# Patient Record
Sex: FEMALE | Race: WHITE | NOT HISPANIC OR LATINO
[De-identification: names, ages, dates, MRNs, and addresses within clinical notes are randomized per-mention and may not be internally consistent; named-entity substitution may affect disease eponyms.]

---

## 2017-02-28 ENCOUNTER — CHARTING TRANS (OUTPATIENT)
Dept: OTHER | Age: 77
End: 2017-02-28

## 2017-02-28 ASSESSMENT — PAIN SCALES - GENERAL: PAINLEVEL_OUTOF10: 2

## 2017-03-03 PROCEDURE — 81001 URINALYSIS AUTO W/SCOPE: CPT | Performed by: NURSE PRACTITIONER

## 2017-03-08 PROBLEM — E78.2 HYPERLIPIDEMIA, MIXED: Status: ACTIVE | Noted: 2017-01-17

## 2017-09-07 PROBLEM — R00.1 BRADYCARDIA: Status: ACTIVE | Noted: 2017-09-07

## 2017-11-30 ENCOUNTER — HOSPITAL (OUTPATIENT)
Dept: OTHER | Age: 77
End: 2017-11-30

## 2017-12-01 PROBLEM — Z95.0 PACEMAKER: Status: ACTIVE | Noted: 2017-12-01

## 2018-01-11 ENCOUNTER — HOSPITAL (OUTPATIENT)
Dept: OTHER | Age: 78
End: 2018-01-11

## 2018-01-11 ENCOUNTER — IMAGING SERVICES (OUTPATIENT)
Dept: OTHER | Age: 78
End: 2018-01-11

## 2018-08-16 PROBLEM — I34.0 NON-RHEUMATIC MITRAL REGURGITATION: Status: ACTIVE | Noted: 2018-08-16

## 2018-11-05 VITALS
DIASTOLIC BLOOD PRESSURE: 88 MMHG | SYSTOLIC BLOOD PRESSURE: 126 MMHG | BODY MASS INDEX: 28.08 KG/M2 | HEIGHT: 65 IN | WEIGHT: 168.56 LBS | OXYGEN SATURATION: 96 % | TEMPERATURE: 98.2 F | HEART RATE: 77 BPM

## 2019-01-14 ENCOUNTER — HOSPITAL (OUTPATIENT)
Dept: OTHER | Age: 79
End: 2019-01-14

## 2021-05-13 PROBLEM — Z95.0 CARDIAC PACEMAKER: Status: ACTIVE | Noted: 2017-12-01

## 2021-06-17 PROBLEM — I48.3 TYPICAL ATRIAL FLUTTER (HCC): Status: ACTIVE | Noted: 2021-06-17

## 2021-07-02 PROBLEM — I35.1 MODERATE AORTIC REGURGITATION: Status: ACTIVE | Noted: 2021-07-02

## 2021-07-02 PROBLEM — I07.1 MODERATE TRICUSPID REGURGITATION: Status: ACTIVE | Noted: 2021-07-02

## 2023-01-11 ENCOUNTER — APPOINTMENT (OUTPATIENT)
Dept: URBAN - METROPOLITAN AREA CLINIC 245 | Age: 83
Setting detail: DERMATOLOGY
End: 2023-01-12

## 2023-01-11 DIAGNOSIS — L29.8 OTHER PRURITUS: ICD-10-CM

## 2023-01-11 DIAGNOSIS — B35.1 TINEA UNGUIUM: ICD-10-CM

## 2023-01-11 PROCEDURE — 11900 INJECT SKIN LESIONS </W 7: CPT

## 2023-01-11 PROCEDURE — OTHER COUNSELING: OTHER

## 2023-01-11 PROCEDURE — OTHER INTRALESIONAL KENALOG: OTHER

## 2023-01-11 PROCEDURE — 99203 OFFICE O/P NEW LOW 30 MIN: CPT | Mod: 25

## 2023-01-11 ASSESSMENT — LOCATION DETAILED DESCRIPTION DERM
LOCATION DETAILED: LEFT SUPERIOR UPPER BACK
LOCATION DETAILED: LEFT INFERIOR UPPER BACK
LOCATION DETAILED: RIGHT GREAT TOENAIL
LOCATION DETAILED: RIGHT MID-UPPER BACK
LOCATION DETAILED: LEFT GREAT TOENAIL

## 2023-01-11 ASSESSMENT — LOCATION SIMPLE DESCRIPTION DERM
LOCATION SIMPLE: LEFT GREAT TOE
LOCATION SIMPLE: RIGHT UPPER BACK
LOCATION SIMPLE: RIGHT GREAT TOE
LOCATION SIMPLE: LEFT UPPER BACK

## 2023-01-11 ASSESSMENT — LOCATION ZONE DERM
LOCATION ZONE: TOENAIL
LOCATION ZONE: TRUNK

## 2023-01-11 NOTE — PROCEDURE: COUNSELING
Detail Level: Zone
Moisturizer Recommendations: Sarna Lotion (put in fridge prior to use)
Patient Specific Counseling (Will Not Stick From Patient To Patient): The patient stated she has a history of injections which helps temporarily.\\nWe discussed treatment options of Gabapentin vs. cool compresses vs. IMK\\nThe patient opted to try another injection first before considering oral medication.\\nI advised the patient to use cool compresses to help relieve the itchiness.

## 2023-05-10 ENCOUNTER — APPOINTMENT (OUTPATIENT)
Dept: URBAN - METROPOLITAN AREA CLINIC 245 | Age: 83
Setting detail: DERMATOLOGY
End: 2023-05-12

## 2023-05-10 DIAGNOSIS — L29.8 OTHER PRURITUS: ICD-10-CM

## 2023-05-10 DIAGNOSIS — L82.1 OTHER SEBORRHEIC KERATOSIS: ICD-10-CM

## 2023-05-10 PROCEDURE — 99213 OFFICE O/P EST LOW 20 MIN: CPT | Mod: 25

## 2023-05-10 PROCEDURE — OTHER INTRALESIONAL KENALOG: OTHER

## 2023-05-10 PROCEDURE — OTHER COUNSELING: OTHER

## 2023-05-10 PROCEDURE — 11900 INJECT SKIN LESIONS </W 7: CPT

## 2023-05-10 ASSESSMENT — LOCATION SIMPLE DESCRIPTION DERM
LOCATION SIMPLE: RIGHT ANTERIOR NECK
LOCATION SIMPLE: LEFT UPPER BACK
LOCATION SIMPLE: RIGHT UPPER BACK

## 2023-05-10 ASSESSMENT — LOCATION DETAILED DESCRIPTION DERM
LOCATION DETAILED: LEFT SUPERIOR UPPER BACK
LOCATION DETAILED: LEFT INFERIOR UPPER BACK
LOCATION DETAILED: RIGHT MID-UPPER BACK
LOCATION DETAILED: RIGHT INFERIOR ANTERIOR NECK

## 2023-05-10 ASSESSMENT — LOCATION ZONE DERM
LOCATION ZONE: NECK
LOCATION ZONE: TRUNK